# Patient Record
Sex: MALE | Race: WHITE
[De-identification: names, ages, dates, MRNs, and addresses within clinical notes are randomized per-mention and may not be internally consistent; named-entity substitution may affect disease eponyms.]

---

## 2020-06-19 ENCOUNTER — HOSPITAL ENCOUNTER (OUTPATIENT)
Dept: HOSPITAL 92 - LABBT | Age: 75
Discharge: HOME | End: 2020-06-19
Attending: UROLOGY
Payer: MEDICARE

## 2020-06-19 DIAGNOSIS — Z11.59: ICD-10-CM

## 2020-06-19 DIAGNOSIS — N40.0: ICD-10-CM

## 2020-06-19 DIAGNOSIS — Z01.812: Primary | ICD-10-CM

## 2020-06-19 LAB
ANION GAP SERPL CALC-SCNC: 14 MMOL/L (ref 10–20)
BUN SERPL-MCNC: 17 MG/DL (ref 8.4–25.7)
CALCIUM SERPL-MCNC: 9.8 MG/DL (ref 7.8–10.44)
CHLORIDE SERPL-SCNC: 105 MMOL/L (ref 98–107)
CO2 SERPL-SCNC: 22 MMOL/L (ref 23–31)
CREAT CL PREDICTED SERPL C-G-VRATE: 0 ML/MIN (ref 70–130)
GLUCOSE SERPL-MCNC: 182 MG/DL (ref 83–110)
HGB BLD-MCNC: 17.1 G/DL (ref 14–18)
MCH RBC QN AUTO: 33.5 PG (ref 27–31)
MCV RBC AUTO: 96.5 FL (ref 78–98)
PLATELET # BLD AUTO: 206 THOU/UL (ref 130–400)
POTASSIUM SERPL-SCNC: 4.3 MMOL/L (ref 3.5–5.1)
RBC # BLD AUTO: 5.11 MILL/UL (ref 4.7–6.1)
RBC UR QL AUTO: (no result) HPF (ref 0–3)
SODIUM SERPL-SCNC: 137 MMOL/L (ref 136–145)
WBC # BLD AUTO: 8.6 THOU/UL (ref 4.8–10.8)
WBC UR QL AUTO: (no result) HPF (ref 0–3)

## 2020-06-19 PROCEDURE — 87086 URINE CULTURE/COLONY COUNT: CPT

## 2020-06-19 PROCEDURE — 85027 COMPLETE CBC AUTOMATED: CPT

## 2020-06-19 PROCEDURE — 87635 SARS-COV-2 COVID-19 AMP PRB: CPT

## 2020-06-19 PROCEDURE — 81001 URINALYSIS AUTO W/SCOPE: CPT

## 2020-06-19 PROCEDURE — U0003 INFECTIOUS AGENT DETECTION BY NUCLEIC ACID (DNA OR RNA); SEVERE ACUTE RESPIRATORY SYNDROME CORONAVIRUS 2 (SARS-COV-2) (CORONAVIRUS DISEASE [COVID-19]), AMPLIFIED PROBE TECHNIQUE, MAKING USE OF HIGH THROUGHPUT TECHNOLOGIES AS DESCRIBED BY CMS-2020-01-R: HCPCS

## 2020-06-19 PROCEDURE — 80048 BASIC METABOLIC PNL TOTAL CA: CPT

## 2020-06-23 ENCOUNTER — HOSPITAL ENCOUNTER (OUTPATIENT)
Dept: HOSPITAL 92 - SDC | Age: 75
Setting detail: OBSERVATION
LOS: 1 days | Discharge: HOME | End: 2020-06-24
Attending: UROLOGY | Admitting: UROLOGY
Payer: MEDICARE

## 2020-06-23 VITALS — BODY MASS INDEX: 30.9 KG/M2

## 2020-06-23 DIAGNOSIS — D29.1: Primary | ICD-10-CM

## 2020-06-23 DIAGNOSIS — R35.0: ICD-10-CM

## 2020-06-23 DIAGNOSIS — Z79.899: ICD-10-CM

## 2020-06-23 DIAGNOSIS — N32.89: ICD-10-CM

## 2020-06-23 DIAGNOSIS — G47.30: ICD-10-CM

## 2020-06-23 DIAGNOSIS — R39.15: ICD-10-CM

## 2020-06-23 DIAGNOSIS — R39.12: ICD-10-CM

## 2020-06-23 PROCEDURE — 96361 HYDRATE IV INFUSION ADD-ON: CPT

## 2020-06-23 PROCEDURE — G0378 HOSPITAL OBSERVATION PER HR: HCPCS

## 2020-06-23 PROCEDURE — 0V507ZZ DESTRUCTION OF PROSTATE, VIA NATURAL OR ARTIFICIAL OPENING: ICD-10-PCS | Performed by: UROLOGY

## 2020-06-23 PROCEDURE — S0028 INJECTION, FAMOTIDINE, 20 MG: HCPCS

## 2020-06-23 PROCEDURE — 96376 TX/PRO/DX INJ SAME DRUG ADON: CPT

## 2020-06-23 PROCEDURE — 96374 THER/PROPH/DIAG INJ IV PUSH: CPT

## 2020-06-23 RX ADMIN — HYDROCODONE BITARTRATE AND ACETAMINOPHEN PRN TAB: 5; 325 TABLET ORAL at 20:05

## 2020-06-23 RX ADMIN — FAMOTIDINE SCH MG: 10 INJECTION, SOLUTION INTRAVENOUS at 20:05

## 2020-06-23 NOTE — OP
DATE OF PROCEDURE:  06/23/2020



PREOPERATIVE DIAGNOSIS:  Enlarged prostate with lower urinary tract symptoms.



POSTOPERATIVE DIAGNOSIS:  Enlarged prostate with lower urinary tract symptoms.



PROCEDURE PERFORMED:  Transurethral vaporization of prostate.



ANESTHESIA:  General.



COMPLICATIONS:  None.



ESTIMATED BLOOD LOSS:  Minimal.



SPECIMENS:  None.



DESCRIPTION OF PROCEDURE:  After informed consent, the patient was taken to the

operating room, transferred to the table under his own power.  Anesthesia was

established.  A time-out was performed, showing the correct patient, site, and

procedure.  Preoperative antibiotics were administered.  He was prepped and draped

in the lithotomy position. 



I began by gently inserting the resectoscope, which was guided down through the

urethra noting a normal course and caliber of the urethra into the prostate noting

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  The

bladder was then entered noting mild trabeculation without mucosal abnormalities.

Both ureters were normal in appearance. 



I then used the VaporTrode/PlasmaButton to resect the regrowth adenoma of the left

lobe from the verumontanum to the bladder neck.  Hemostasis was then achieved.  The

bladder was drained and the prostatic fossa was re-examined noting an excellent

channel with no further obstruction or active bleeding.  The scope was withdrawn,

and a 

20-[place]oven[place] three-way catheter placed with 30 mL instilled in the balloon.  This

connected to bag drainage and continuous bladder irrigation.  The patient was then

transferred 

back to his hospital bed and taken to PACU in stable condition, where he will be

admitted overnight for CBI. 







Job ID:  [id number]

## 2020-06-24 VITALS — DIASTOLIC BLOOD PRESSURE: 72 MMHG | SYSTOLIC BLOOD PRESSURE: 125 MMHG

## 2020-06-24 VITALS — TEMPERATURE: 98.2 F

## 2020-06-24 RX ADMIN — FAMOTIDINE SCH MG: 10 INJECTION, SOLUTION INTRAVENOUS at 07:54

## 2020-06-24 RX ADMIN — HYDROCODONE BITARTRATE AND ACETAMINOPHEN PRN TAB: 5; 325 TABLET ORAL at 06:08

## 2020-06-24 NOTE — DIS
DATE OF ADMISSION:  06/23/2020



DATE OF DISCHARGE:  06/24/2020



CHIEF COMPLAINT:  Lower urinary tract symptoms due to enlarged prostate.



FINAL DIAGNOSES:  Lower urinary tract symptoms due to enlarged prostate.



HOSPITAL COURSE:  The patient underwent uncomplicated transurethral vaporization of

the prostate.  He was maintained with CBI overnight, which remained clear.  The

following morning, CBI was turned off with urine remaining clear without it.  He was

tolerating diet and having no pain.  At that point, he was deemed stable for

discharge home. 



DISCHARGE ACTIVITY:  As tolerated.



DISCHARGE MEDICATIONS:  Resume all home medications.  Postop medications include

Bactrim, oxybutynin, tramadol, ibuprofen. 



FOLLOWUP PLAN:  Next Monday for void trial.







Job ID:  [id number]

## 2021-02-10 ENCOUNTER — HOSPITAL ENCOUNTER (OUTPATIENT)
Dept: HOSPITAL 92 - BICULT | Age: 76
Discharge: HOME | End: 2021-02-10
Attending: UROLOGY
Payer: MEDICARE

## 2021-02-10 DIAGNOSIS — N20.1: Primary | ICD-10-CM

## 2021-02-10 PROCEDURE — 76770 US EXAM ABDO BACK WALL COMP: CPT

## 2021-02-10 NOTE — ULT
US Renal Bilateral STANDARD: 2/10/2021 3:32 PM



CLINICAL HISTORY: Kidney stones and ureteral stones.



STUDY: Renal ultrasound



COMPARISON: None.                  



FINDINGS:



Right kidney: 

Echogenicity: Normal. 

Masses/cysts:  None.   

Hydronephrosis: None. 

Calcifications: None.  

Length: 11.1 cm



Left kidney: 

Echogenicity: Normal. 

Masses/cysts:  None.   

Hydronephrosis: None. 

Calcifications: None.  

Length: 12.0 cm



Limited visualization of the urinary bladder is unremarkable. Both ureteral jets were visualized. Min
imal post void residual.



IMPRESSION:

Unremarkable renal ultrasound



Reported By: Selvin Pitts 

Electronically Signed:  2/10/2021 4:00 PM